# Patient Record
Sex: FEMALE | URBAN - METROPOLITAN AREA
[De-identification: names, ages, dates, MRNs, and addresses within clinical notes are randomized per-mention and may not be internally consistent; named-entity substitution may affect disease eponyms.]

---

## 2019-10-29 ENCOUNTER — APPOINTMENT (RX ONLY)
Dept: URBAN - METROPOLITAN AREA CLINIC 87 | Facility: CLINIC | Age: 24
Setting detail: DERMATOLOGY
End: 2019-10-29

## 2019-10-29 VITALS — HEIGHT: 64 IN | WEIGHT: 205 LBS

## 2019-10-29 DIAGNOSIS — D22 MELANOCYTIC NEVI: ICD-10-CM

## 2019-10-29 DIAGNOSIS — L72.11 PILAR CYST: ICD-10-CM

## 2019-10-29 PROBLEM — D22.5 MELANOCYTIC NEVI OF TRUNK: Status: ACTIVE | Noted: 2019-10-29

## 2019-10-29 PROCEDURE — ? COUNSELING

## 2019-10-29 PROCEDURE — 99202 OFFICE O/P NEW SF 15 MIN: CPT | Mod: 25

## 2019-10-29 PROCEDURE — 11104 PUNCH BX SKIN SINGLE LESION: CPT

## 2019-10-29 PROCEDURE — ? BIOPSY BY PUNCH METHOD

## 2019-10-29 ASSESSMENT — LOCATION ZONE DERM
LOCATION ZONE: TRUNK
LOCATION ZONE: SCALP
LOCATION ZONE: FACE

## 2019-10-29 ASSESSMENT — LOCATION SIMPLE DESCRIPTION DERM
LOCATION SIMPLE: RIGHT FOREHEAD
LOCATION SIMPLE: LEFT SCALP
LOCATION SIMPLE: ABDOMEN

## 2019-10-29 ASSESSMENT — LOCATION DETAILED DESCRIPTION DERM
LOCATION DETAILED: RIGHT SUPERIOR MEDIAL FOREHEAD
LOCATION DETAILED: EPIGASTRIC SKIN
LOCATION DETAILED: LEFT MEDIAL FRONTAL SCALP
LOCATION DETAILED: PERIUMBILICAL SKIN

## 2019-10-29 NOTE — PROCEDURE: MIPS QUALITY
Detail Level: Generalized
Quality 130: Documentation Of Current Medications In The Medical Record: Current Medications Documented
Quality 226: Preventive Care And Screening: Tobacco Use: Screening And Cessation Intervention: Tobacco Screening not Performed for Medical Reasons

## 2019-10-29 NOTE — HPI: SKIN LESIONS
How Severe Is Your Skin Lesion?: mild
Have Your Skin Lesions Been Treated?: not been treated
Is This A New Presentation, Or A Follow-Up?: Skin Lesions
Which Family Member (Optional)?: Grandma and aunt

## 2019-10-29 NOTE — PROCEDURE: BIOPSY BY PUNCH METHOD
Bill For Surgical Tray: no
Notification Instructions: Patient will be notified of biopsy results. However, patient instructed to call the office if not contacted within 2 weeks.
Anesthesia Type: 1% Xylocaine with [id number] epinephrine and sodium bicarbonate
X Size Of Lesion In Cm (Optional): 0
Hemostasis: None
Punch Size In Mm: 8
Anesthesia Volume In Cc: 1.5
Was A Bandage Applied: Yes
Dressing: bandage
Path Notes (To The Dermatopathologist): Please check margins
Suture Removal: 14 days
Epidermal Sutures: 4-0 Nylon
Wound Care: Polysporin ointment
Home Suture Removal Text: Patient was provided a home suture removal kit and will remove their sutures at home.  If they have any questions or difficulties they will call the office.
Detail Level: Detailed
Post-Care Instructions: I reviewed with the patient in detail post-care instructions. Patient is to keep the biopsy site dry overnight, and then apply bacitracin twice daily until healed. Patient may apply hydrogen peroxide soaks to remove any crusting.
Consent: Written consent was obtained and risks were reviewed including but not limited to scarring, infection, bleeding, scabbing, incomplete removal, nerve damage and allergy to anesthesia.
Billing Type: Third-Party Bill
Biopsy Type: H and E

## 2019-11-12 ENCOUNTER — APPOINTMENT (RX ONLY)
Dept: URBAN - METROPOLITAN AREA CLINIC 87 | Facility: CLINIC | Age: 24
Setting detail: DERMATOLOGY
End: 2019-11-12

## 2019-11-12 VITALS — HEIGHT: 64 IN | WEIGHT: 205 LBS

## 2019-11-12 DIAGNOSIS — Z48.817 ENCOUNTER FOR SURGICAL AFTERCARE FOLLOWING SURGERY ON THE SKIN AND SUBCUTANEOUS TISSUE: ICD-10-CM

## 2019-11-12 DIAGNOSIS — L72.11 PILAR CYST: ICD-10-CM

## 2019-11-12 PROCEDURE — ? BIOPSY BY PUNCH METHOD

## 2019-11-12 PROCEDURE — ? COUNSELING

## 2019-11-12 PROCEDURE — ? SUTURE REMOVAL (GLOBAL PERIOD)

## 2019-11-12 PROCEDURE — 11104 PUNCH BX SKIN SINGLE LESION: CPT

## 2019-11-12 PROCEDURE — ? POST-OP WOUND CHECK

## 2019-11-12 ASSESSMENT — LOCATION DETAILED DESCRIPTION DERM
LOCATION DETAILED: RIGHT SUPERIOR MEDIAL FOREHEAD
LOCATION DETAILED: LEFT MEDIAL FRONTAL SCALP

## 2019-11-12 ASSESSMENT — LOCATION ZONE DERM
LOCATION ZONE: FACE
LOCATION ZONE: SCALP

## 2019-11-12 ASSESSMENT — LOCATION SIMPLE DESCRIPTION DERM
LOCATION SIMPLE: RIGHT FOREHEAD
LOCATION SIMPLE: LEFT SCALP

## 2019-11-12 NOTE — PROCEDURE: POST-OP WOUND CHECK
Detail Level: Detailed
Add 45849 Cpt? (Important Note: In 2017 The Use Of 87068 Is Being Tracked By Cms To Determine Future Global Period Reimbursement For Global Periods): no
Wound Evaluated By: Dr. Omayra Martinez

## 2019-11-12 NOTE — PROCEDURE: MIPS QUALITY
Quality 130: Documentation Of Current Medications In The Medical Record: Current Medications Documented
Quality 226: Preventive Care And Screening: Tobacco Use: Screening And Cessation Intervention: Tobacco Screening not Performed for Medical Reasons
Detail Level: Generalized

## 2019-11-12 NOTE — PROCEDURE: SUTURE REMOVAL (GLOBAL PERIOD)
Detail Level: Simple
Add 40063 Cpt? (Important Note: In 2017 The Use Of 07084 Is Being Tracked By Cms To Determine Future Global Period Reimbursement For Global Periods): no

## 2019-11-12 NOTE — PROCEDURE: BIOPSY BY PUNCH METHOD
Home Suture Removal Text: Patient was provided a home suture removal kit and will remove their sutures at home.  If they have any questions or difficulties they will call the office.
Anesthesia Volume In Cc: 1.5
Post-Care Instructions: I reviewed with the patient in detail post-care instructions. Patient is to keep the biopsy site dry overnight, and then apply bacitracin twice daily until healed. Patient may apply hydrogen peroxide soaks to remove any crusting.
Size Of Lesion In Cm (Optional): 0
Wound Care: Polysporin ointment
Bill 80886 For Specimen Handling/Conveyance To Laboratory?: no
Notification Instructions: Patient will be notified of biopsy results. However, patient instructed to call the office if not contacted within 2 weeks.
Anesthesia Type: 1% Xylocaine with 1:[id number] epinephrine and sodium bicarbonate
Epidermal Sutures: 4-0 Nylon
Dressing: bandage
Biopsy Type: H and E
Punch Size In Mm: 5
Consent: Written consent was obtained and risks were reviewed including but not limited to scarring, infection, bleeding, scabbing, incomplete removal, nerve damage and allergy to anesthesia.
Detail Level: Detailed
Render Post-Care Instructions In Note?: yes
Path Notes (To The Dermatopathologist): Please check margins
Billing Type: Third-Party Bill
Suture Removal: 14 days
Hemostasis: None

## 2019-11-26 ENCOUNTER — APPOINTMENT (RX ONLY)
Dept: URBAN - METROPOLITAN AREA CLINIC 87 | Facility: CLINIC | Age: 24
Setting detail: DERMATOLOGY
End: 2019-11-26

## 2019-11-26 VITALS — WEIGHT: 205 LBS | HEIGHT: 64 IN

## 2019-11-26 DIAGNOSIS — Z48.817 ENCOUNTER FOR SURGICAL AFTERCARE FOLLOWING SURGERY ON THE SKIN AND SUBCUTANEOUS TISSUE: ICD-10-CM

## 2019-11-26 PROCEDURE — ? SUTURE REMOVAL (GLOBAL PERIOD)

## 2019-11-26 PROCEDURE — 99024 POSTOP FOLLOW-UP VISIT: CPT

## 2019-11-26 PROCEDURE — ? POST-OP WOUND CHECK

## 2019-11-26 ASSESSMENT — LOCATION SIMPLE DESCRIPTION DERM: LOCATION SIMPLE: RIGHT FOREHEAD

## 2019-11-26 ASSESSMENT — LOCATION DETAILED DESCRIPTION DERM: LOCATION DETAILED: RIGHT SUPERIOR MEDIAL FOREHEAD

## 2019-11-26 ASSESSMENT — LOCATION ZONE DERM: LOCATION ZONE: FACE

## 2019-11-26 NOTE — PROCEDURE: SUTURE REMOVAL (GLOBAL PERIOD)
Detail Level: Simple
Add 97970 Cpt? (Important Note: In 2017 The Use Of 89067 Is Being Tracked By Cms To Determine Future Global Period Reimbursement For Global Periods): yes

## 2019-11-26 NOTE — PROCEDURE: MIPS QUALITY
Quality 226: Preventive Care And Screening: Tobacco Use: Screening And Cessation Intervention: Tobacco Screening not Performed for Medical Reasons
Quality 130: Documentation Of Current Medications In The Medical Record: Current Medications Documented
Detail Level: Generalized

## 2019-11-26 NOTE — PROCEDURE: POST-OP WOUND CHECK
Add 94025 Cpt? (Important Note: In 2017 The Use Of 73675 Is Being Tracked By Cms To Determine Future Global Period Reimbursement For Global Periods): yes
Wound Evaluated By: Dr. Omayra Martinez
Detail Level: Detailed

## 2019-12-03 ENCOUNTER — APPOINTMENT (RX ONLY)
Dept: URBAN - METROPOLITAN AREA CLINIC 87 | Facility: CLINIC | Age: 24
Setting detail: DERMATOLOGY
End: 2019-12-03

## 2019-12-03 VITALS — WEIGHT: 205 LBS | HEIGHT: 64 IN

## 2019-12-03 DIAGNOSIS — L91.8 OTHER HYPERTROPHIC DISORDERS OF THE SKIN: ICD-10-CM

## 2019-12-03 DIAGNOSIS — L90.5 SCAR CONDITIONS AND FIBROSIS OF SKIN: ICD-10-CM

## 2019-12-03 PROBLEM — D48.5 NEOPLASM OF UNCERTAIN BEHAVIOR OF SKIN: Status: ACTIVE | Noted: 2019-12-03

## 2019-12-03 PROBLEM — J45.909 UNSPECIFIED ASTHMA, UNCOMPLICATED: Status: ACTIVE | Noted: 2019-12-03

## 2019-12-03 PROCEDURE — ? BIOPSY BY SHAVE METHOD

## 2019-12-03 PROCEDURE — ? COUNSELING

## 2019-12-03 PROCEDURE — 11102 TANGNTL BX SKIN SINGLE LES: CPT

## 2019-12-03 PROCEDURE — 99213 OFFICE O/P EST LOW 20 MIN: CPT | Mod: 25

## 2019-12-03 PROCEDURE — ? ADDITIONAL NOTES

## 2019-12-03 ASSESSMENT — LOCATION DETAILED DESCRIPTION DERM
LOCATION DETAILED: RIGHT MEDIAL FRONTAL SCALP
LOCATION DETAILED: RIGHT ANTERIOR SHOULDER

## 2019-12-03 ASSESSMENT — LOCATION ZONE DERM
LOCATION ZONE: SCALP
LOCATION ZONE: ARM

## 2019-12-03 ASSESSMENT — LOCATION SIMPLE DESCRIPTION DERM
LOCATION SIMPLE: RIGHT SCALP
LOCATION SIMPLE: RIGHT SHOULDER

## 2019-12-03 NOTE — PROCEDURE: ADDITIONAL NOTES
Additional Notes: S/p punch excision of Pilar cyst. No symptoms of infection. Wound site may have a minor dehiscence and is taking longer to heal.
Detail Level: Zone

## 2019-12-03 NOTE — PROCEDURE: MIPS QUALITY
Quality 226: Preventive Care And Screening: Tobacco Use: Screening And Cessation Intervention: Patient screened for tobacco use and is an ex/non-smoker
Quality 130: Documentation Of Current Medications In The Medical Record: Current Medications Documented
Quality 128: Preventive Care And Screening: Body Mass Index (Bmi) Screening And Follow-Up Plan: BMI documented as out of range, follow up plan not documented, reason not otherwise specified.
Detail Level: Generalized

## 2019-12-03 NOTE — PROCEDURE: BIOPSY BY SHAVE METHOD
Render In Bullet Format When Appropriate: No
Biopsy Method: scissors
Was A Bandage Applied: Yes
Hemostasis: Aluminum Chloride
Electrodesiccation Text: The wound bed was treated with electrodesiccation after the biopsy was performed.
X Size Of Lesion In Cm: 0
Anesthesia Type: 1% lidocaine with 1:400,000 epinephrine and a 1:10 solution of 8.4% sodium bicarbonate
Curettage Text: The wound bed was treated with curettage after the biopsy was performed.
Consent: Written consent was obtained and risks were reviewed including but not limited to scarring, infection, bleeding, scabbing, incomplete removal, nerve damage and allergy to anesthesia.
Notification Instructions: Patient will be notified of biopsy results. However, patient instructed to call the office if not contacted within 2 weeks.
Post-Care Instructions: I reviewed with the patient in detail post-care instructions. Patient is to keep the biopsy site dry overnight, and then apply bacitracin twice daily until healed. Patient may apply hydrogen peroxide soaks to remove any crusting.
Size Of Lesion In Cm: 0.6
Wound Care: Polysporin ointment
Cryotherapy Text: The wound bed was treated with cryotherapy after the biopsy was performed.
Anesthesia Volume In Cc: 0.3
Billing Type: Third-Party Bill
Detail Level: Detailed
Electrodesiccation And Curettage Text: The wound bed was treated with electrodesiccation and curettage after the biopsy was performed.
Biopsy Type: H and E
Dressing: Band-Aid
Silver Nitrate Text: The wound bed was treated with silver nitrate after the biopsy was performed.
Depth Of Biopsy: dermis
Type Of Destruction Used: Electrodesiccation